# Patient Record
Sex: MALE | Race: WHITE
[De-identification: names, ages, dates, MRNs, and addresses within clinical notes are randomized per-mention and may not be internally consistent; named-entity substitution may affect disease eponyms.]

---

## 2018-04-09 ENCOUNTER — HOSPITAL ENCOUNTER (EMERGENCY)
Dept: HOSPITAL 12 - ER | Age: 68
Discharge: HOME | End: 2018-04-09
Payer: MEDICARE

## 2018-04-09 VITALS — WEIGHT: 190 LBS | BODY MASS INDEX: 27.2 KG/M2 | HEIGHT: 70 IN

## 2018-04-09 VITALS — DIASTOLIC BLOOD PRESSURE: 72 MMHG | SYSTOLIC BLOOD PRESSURE: 122 MMHG

## 2018-04-09 DIAGNOSIS — Z79.899: ICD-10-CM

## 2018-04-09 DIAGNOSIS — I10: ICD-10-CM

## 2018-04-09 DIAGNOSIS — E78.5: ICD-10-CM

## 2018-04-09 DIAGNOSIS — Z88.1: ICD-10-CM

## 2018-04-09 DIAGNOSIS — N20.0: Primary | ICD-10-CM

## 2018-04-09 LAB
ALP SERPL-CCNC: 72 U/L (ref 50–136)
ALT SERPL W/O P-5'-P-CCNC: 69 U/L (ref 16–63)
APPEARANCE UR: CLEAR
AST SERPL-CCNC: 35 U/L (ref 15–37)
BASOPHILS # BLD AUTO: 0 K/UL (ref 0–8)
BASOPHILS NFR BLD AUTO: 0.3 % (ref 0–2)
BILIRUB DIRECT SERPL-MCNC: 0.1 MG/DL (ref 0–0.2)
BILIRUB SERPL-MCNC: 0.5 MG/DL (ref 0.2–1)
BILIRUB UR QL STRIP: NEGATIVE
BUN SERPL-MCNC: 32 MG/DL (ref 7–18)
CHLORIDE SERPL-SCNC: 103 MMOL/L (ref 98–107)
CO2 SERPL-SCNC: 25 MMOL/L (ref 21–32)
COLOR UR: YELLOW
CREAT SERPL-MCNC: 1.5 MG/DL (ref 0.6–1.3)
DEPRECATED SQUAMOUS URNS QL MICRO: (no result) /HPF
EOSINOPHIL # BLD AUTO: 0.1 K/UL (ref 0–0.7)
EOSINOPHIL NFR BLD AUTO: 0.6 % (ref 0–7)
GLUCOSE SERPL-MCNC: 117 MG/DL (ref 74–106)
GLUCOSE UR STRIP-MCNC: NEGATIVE MG/DL
HCT VFR BLD AUTO: 43.8 % (ref 36.7–47.1)
HGB BLD-MCNC: 14.7 G/DL (ref 12.5–16.3)
HGB UR QL STRIP: NEGATIVE
KETONES UR STRIP-MCNC: NEGATIVE MG/DL
LEUKOCYTE ESTERASE UR QL STRIP: NEGATIVE
LIPASE SERPL-CCNC: 155 U/L (ref 73–393)
LYMPHOCYTES # BLD AUTO: 0.9 K/UL (ref 20–40)
LYMPHOCYTES NFR BLD AUTO: 9.3 % (ref 20.5–51.5)
MCH RBC QN AUTO: 29.3 UUG (ref 23.8–33.4)
MCHC RBC AUTO-ENTMCNC: 34 G/DL (ref 32.5–36.3)
MCV RBC AUTO: 87.5 FL (ref 73–96.2)
MONOCYTES # BLD AUTO: 0.9 K/UL (ref 2–10)
MONOCYTES NFR BLD AUTO: 10.3 % (ref 0–11)
MUCOUS THREADS URNS QL MICRO: (no result) /LPF
NEUTROPHILS # BLD AUTO: 7.3 K/UL (ref 1.8–8.9)
NEUTROPHILS NFR BLD AUTO: 79.5 % (ref 38.5–71.5)
NITRITE UR QL STRIP: NEGATIVE
PH UR STRIP: 5.5 [PH] (ref 5–8)
PLATELET # BLD AUTO: 129 K/UL (ref 152–348)
POTASSIUM SERPL-SCNC: 4.4 MMOL/L (ref 3.5–5.1)
PROT UR QL STRIP: NEGATIVE
RBC # BLD AUTO: 5.01 MIL/UL (ref 4.06–5.63)
RBC #/AREA URNS HPF: (no result) /HPF (ref 0–3)
SP GR UR STRIP: 1.01 (ref 1–1.03)
UROBILINOGEN UR STRIP-MCNC: 0.2 E.U./DL
WBC # BLD AUTO: 9.1 K/UL (ref 3.6–10.2)
WBC #/AREA URNS HPF: (no result) /HPF
WBC #/AREA URNS HPF: (no result) /HPF (ref 0–3)
WS STN SPEC: 6.9 G/DL (ref 6.4–8.2)

## 2018-04-09 PROCEDURE — A4663 DIALYSIS BLOOD PRESSURE CUFF: HCPCS

## 2018-04-09 NOTE — NUR
iv d/c'd intact, aci/rx x2/copires of tests and ct results given. pt got 
dressed anfd ambulated w/o diff/took all belongings.